# Patient Record
Sex: FEMALE | Race: OTHER | HISPANIC OR LATINO | URBAN - METROPOLITAN AREA
[De-identification: names, ages, dates, MRNs, and addresses within clinical notes are randomized per-mention and may not be internally consistent; named-entity substitution may affect disease eponyms.]

---

## 2017-03-20 ENCOUNTER — EMERGENCY (EMERGENCY)
Facility: HOSPITAL | Age: 22
LOS: 1 days | Discharge: HOME | End: 2017-03-20
Attending: EMERGENCY MEDICINE | Admitting: EMERGENCY MEDICINE
Payer: COMMERCIAL

## 2017-03-20 VITALS
WEIGHT: 130.07 LBS | DIASTOLIC BLOOD PRESSURE: 74 MMHG | RESPIRATION RATE: 16 BRPM | TEMPERATURE: 98 F | OXYGEN SATURATION: 99 % | HEART RATE: 82 BPM | SYSTOLIC BLOOD PRESSURE: 128 MMHG
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
## 2017-03-20 DIAGNOSIS — T19.2XXA FOREIGN BODY IN VULVA AND VAGINA, INITIAL ENCOUNTER: ICD-10-CM

## 2017-03-20 LAB
APPEARANCE UR: CLEAR — SIGNIFICANT CHANGE UP
BILIRUB UR-MCNC: NEGATIVE — SIGNIFICANT CHANGE UP
COLOR SPEC: YELLOW — SIGNIFICANT CHANGE UP
DIFF PNL FLD: (no result)
GLUCOSE UR QL: NEGATIVE — SIGNIFICANT CHANGE UP
KETONES UR-MCNC: (no result) MG/DL
LEUKOCYTE ESTERASE UR-ACNC: NEGATIVE — SIGNIFICANT CHANGE UP
NITRITE UR-MCNC: NEGATIVE — SIGNIFICANT CHANGE UP
PH UR: 7 — SIGNIFICANT CHANGE UP (ref 4–8)
PROT UR-MCNC: NEGATIVE MG/DL — SIGNIFICANT CHANGE UP
SP GR SPEC: 1.02 — SIGNIFICANT CHANGE UP (ref 1–1.03)
UROBILINOGEN FLD QL: 0.2 E.U./DL — SIGNIFICANT CHANGE UP

## 2017-03-20 PROCEDURE — 81003 URINALYSIS AUTO W/O SCOPE: CPT

## 2017-03-20 PROCEDURE — 81001 URINALYSIS AUTO W/SCOPE: CPT

## 2017-03-20 PROCEDURE — 99284 EMERGENCY DEPT VISIT MOD MDM: CPT

## 2017-03-20 PROCEDURE — 87086 URINE CULTURE/COLONY COUNT: CPT

## 2017-03-20 NOTE — ED ADULT NURSE NOTE - CHPI ED SYMPTOMS NEG
no abdominal distension/no blood in stool/no chills/no diarrhea/no nausea/no dysuria/no fever/no burning urination/no hematuria/no vomiting

## 2017-03-20 NOTE — ED ADULT NURSE NOTE - CHIEF COMPLAINT QUOTE
abdominal pain x3 days. Reports "I feel like there is something in my vagina." LMP 2/1/17. Reports recent trip to [place] and states "I was in the pool." Denies any recent sexual activity. Reports brown vaginal discharge. Denies any nausea or vomiting.

## 2017-03-20 NOTE — ED ADULT NURSE NOTE - OBJECTIVE STATEMENT
21 y F, A&ox3, came into Er c/o pelvic pain and vaginal fullness. PT states "feels like something in there" Pt denies sexual intercourse, denies abdominal pain, no n/v, no urinary s/s. no cp, no sob. states vaginal discharge, brown in color spotting, no bleeding. last menstrual period x2 weeks ago. NAD. Will continue to monitor.

## 2017-03-20 NOTE — ED PROVIDER NOTE - OBJECTIVE STATEMENT
20 y/o f with no pmh presents stating she has some discomfort in vagina with brownish discharge for the past 2 days, thought she felt something inside her vagina yesterday while masturbating.  Pt denies fever, dysuria, recent unprotected intercourse.

## 2017-03-20 NOTE — ED PROVIDER NOTE - GENITOURINARY, MLM
chaperone Tess PCA; externally no lesions, internally mucosa pink, moist, 3 inch cardboard tube in vault, no bleeding, cervical os closed, no CMT, no adnexal tenderness or masses

## 2017-03-20 NOTE — ED ADULT TRIAGE NOTE - CHIEF COMPLAINT QUOTE
abdominal pain x3 days. Reports "I feel like there is something in my vagina." LMP 2/1/17. Reports recent trip to [place] and states "I was in the pool." Denies any sexual activity. Reports brown vaginal discharge. Denies any nausea or vomiting. abdominal pain x3 days. Reports "I feel like there is something in my vagina." LMP 2/1/17. Reports recent trip to [place] and states "I was in the pool." Denies any recent sexual activity. Reports brown vaginal discharge. Denies any nausea or vomiting.

## 2017-03-21 LAB
CULTURE RESULTS: NO GROWTH — SIGNIFICANT CHANGE UP
SPECIMEN SOURCE: SIGNIFICANT CHANGE UP

## 2017-03-22 LAB
C TRACH RRNA SPEC QL NAA+PROBE: SIGNIFICANT CHANGE UP
N GONORRHOEA RRNA SPEC QL NAA+PROBE: SIGNIFICANT CHANGE UP
SPECIMEN SOURCE: SIGNIFICANT CHANGE UP
